# Patient Record
Sex: MALE | Race: WHITE | NOT HISPANIC OR LATINO | URBAN - METROPOLITAN AREA
[De-identification: names, ages, dates, MRNs, and addresses within clinical notes are randomized per-mention and may not be internally consistent; named-entity substitution may affect disease eponyms.]

---

## 2017-01-12 ENCOUNTER — OUTPATIENT (OUTPATIENT)
Dept: OUTPATIENT SERVICES | Facility: HOSPITAL | Age: 67
LOS: 1 days | End: 2017-01-12
Payer: MEDICARE

## 2017-01-12 DIAGNOSIS — Z01.818 ENCOUNTER FOR OTHER PREPROCEDURAL EXAMINATION: ICD-10-CM

## 2017-01-12 DIAGNOSIS — M19.042 PRIMARY OSTEOARTHRITIS, LEFT HAND: ICD-10-CM

## 2017-01-12 DIAGNOSIS — I10 ESSENTIAL (PRIMARY) HYPERTENSION: ICD-10-CM

## 2017-01-12 DIAGNOSIS — Z01.812 ENCOUNTER FOR PREPROCEDURAL LABORATORY EXAMINATION: ICD-10-CM

## 2017-01-12 DIAGNOSIS — M67.449 GANGLION, UNSPECIFIED HAND: ICD-10-CM

## 2017-01-12 DIAGNOSIS — Z01.810 ENCOUNTER FOR PREPROCEDURAL CARDIOVASCULAR EXAMINATION: ICD-10-CM

## 2017-01-12 LAB
ANION GAP SERPL CALC-SCNC: 14 MMOL/L — SIGNIFICANT CHANGE UP (ref 5–17)
BUN SERPL-MCNC: 19 MG/DL — SIGNIFICANT CHANGE UP (ref 7–23)
CALCIUM SERPL-MCNC: 9.6 MG/DL — SIGNIFICANT CHANGE UP (ref 8.4–10.5)
CHLORIDE SERPL-SCNC: 104 MMOL/L — SIGNIFICANT CHANGE UP (ref 96–108)
CO2 SERPL-SCNC: 23 MMOL/L — SIGNIFICANT CHANGE UP (ref 22–31)
CREAT SERPL-MCNC: 1 MG/DL — SIGNIFICANT CHANGE UP (ref 0.5–1.3)
GLUCOSE SERPL-MCNC: 93 MG/DL — SIGNIFICANT CHANGE UP (ref 70–99)
HCT VFR BLD CALC: 45.9 % — SIGNIFICANT CHANGE UP (ref 39–50)
HGB BLD-MCNC: 15.5 G/DL — SIGNIFICANT CHANGE UP (ref 13–17)
MCHC RBC-ENTMCNC: 30.2 PG — SIGNIFICANT CHANGE UP (ref 27–34)
MCHC RBC-ENTMCNC: 33.8 GM/DL — SIGNIFICANT CHANGE UP (ref 32–36)
MCV RBC AUTO: 89.3 FL — SIGNIFICANT CHANGE UP (ref 80–100)
PLATELET # BLD AUTO: 241 K/UL — SIGNIFICANT CHANGE UP (ref 150–400)
POTASSIUM SERPL-MCNC: 4.2 MMOL/L — SIGNIFICANT CHANGE UP (ref 3.5–5.3)
POTASSIUM SERPL-SCNC: 4.2 MMOL/L — SIGNIFICANT CHANGE UP (ref 3.5–5.3)
RBC # BLD: 5.14 M/UL — SIGNIFICANT CHANGE UP (ref 4.2–5.8)
RBC # FLD: 13.5 % — SIGNIFICANT CHANGE UP (ref 10.3–14.5)
SODIUM SERPL-SCNC: 141 MMOL/L — SIGNIFICANT CHANGE UP (ref 135–145)
WBC # BLD: 6.21 K/UL — SIGNIFICANT CHANGE UP (ref 3.8–10.5)
WBC # FLD AUTO: 6.21 K/UL — SIGNIFICANT CHANGE UP (ref 3.8–10.5)

## 2017-01-12 PROCEDURE — 85027 COMPLETE CBC AUTOMATED: CPT

## 2017-01-12 PROCEDURE — G0463: CPT

## 2017-01-12 PROCEDURE — 93005 ELECTROCARDIOGRAM TRACING: CPT

## 2017-01-12 PROCEDURE — 36415 COLL VENOUS BLD VENIPUNCTURE: CPT

## 2017-01-12 PROCEDURE — 93010 ELECTROCARDIOGRAM REPORT: CPT

## 2017-01-12 PROCEDURE — 80048 BASIC METABOLIC PNL TOTAL CA: CPT

## 2017-01-22 ENCOUNTER — RESULT REVIEW (OUTPATIENT)
Age: 67
End: 2017-01-22

## 2017-01-23 ENCOUNTER — APPOINTMENT (OUTPATIENT)
Dept: ORTHOPEDIC SURGERY | Facility: HOSPITAL | Age: 67
End: 2017-01-23

## 2017-01-23 ENCOUNTER — OUTPATIENT (OUTPATIENT)
Dept: OUTPATIENT SERVICES | Facility: HOSPITAL | Age: 67
LOS: 1 days | End: 2017-01-23
Payer: MEDICARE

## 2017-01-23 DIAGNOSIS — M19.042 PRIMARY OSTEOARTHRITIS, LEFT HAND: ICD-10-CM

## 2017-01-23 DIAGNOSIS — M67.449 GANGLION, UNSPECIFIED HAND: ICD-10-CM

## 2017-01-23 PROCEDURE — 20526 THER INJECTION CARP TUNNEL: CPT | Mod: 50,59

## 2017-01-23 PROCEDURE — 20526 THER INJECTION CARP TUNNEL: CPT | Mod: 50,XS

## 2017-01-23 PROCEDURE — 88305 TISSUE EXAM BY PATHOLOGIST: CPT | Mod: 26

## 2017-01-23 PROCEDURE — 26160 REMOVE TENDON SHEATH LESION: CPT | Mod: F1

## 2017-01-23 PROCEDURE — 88305 TISSUE EXAM BY PATHOLOGIST: CPT

## 2017-01-27 LAB — SURGICAL PATHOLOGY STUDY: SIGNIFICANT CHANGE UP

## 2017-02-02 ENCOUNTER — APPOINTMENT (OUTPATIENT)
Dept: ORTHOPEDIC SURGERY | Facility: CLINIC | Age: 67
End: 2017-02-02

## 2017-04-13 ENCOUNTER — APPOINTMENT (OUTPATIENT)
Dept: ORTHOPEDIC SURGERY | Facility: CLINIC | Age: 67
End: 2017-04-13

## 2017-04-13 DIAGNOSIS — M65.331 TRIGGER FINGER, RIGHT MIDDLE FINGER: ICD-10-CM

## 2017-04-13 DIAGNOSIS — M19.042 PRIMARY OSTEOARTHRITIS, LEFT HAND: ICD-10-CM

## 2017-04-13 DIAGNOSIS — R20.0 ANESTHESIA OF SKIN: ICD-10-CM

## 2017-04-13 DIAGNOSIS — M19.041 PRIMARY OSTEOARTHRITIS, RIGHT HAND: ICD-10-CM

## 2017-04-13 DIAGNOSIS — M67.449 GANGLION, UNSPECIFIED HAND: ICD-10-CM

## 2017-05-30 ENCOUNTER — OUTPATIENT (OUTPATIENT)
Dept: OUTPATIENT SERVICES | Facility: HOSPITAL | Age: 67
LOS: 1 days | End: 2017-05-30
Payer: MEDICARE

## 2017-05-30 VITALS
TEMPERATURE: 97 F | OXYGEN SATURATION: 99 % | WEIGHT: 266.1 LBS | DIASTOLIC BLOOD PRESSURE: 88 MMHG | HEIGHT: 70 IN | SYSTOLIC BLOOD PRESSURE: 136 MMHG

## 2017-05-30 DIAGNOSIS — Z98.890 OTHER SPECIFIED POSTPROCEDURAL STATES: Chronic | ICD-10-CM

## 2017-05-30 DIAGNOSIS — M18.12 UNILATERAL PRIMARY OSTEOARTHRITIS OF FIRST CARPOMETACARPAL JOINT, LEFT HAND: ICD-10-CM

## 2017-05-30 DIAGNOSIS — E78.5 HYPERLIPIDEMIA, UNSPECIFIED: ICD-10-CM

## 2017-05-30 DIAGNOSIS — M19.90 UNSPECIFIED OSTEOARTHRITIS, UNSPECIFIED SITE: ICD-10-CM

## 2017-05-30 DIAGNOSIS — I48.91 UNSPECIFIED ATRIAL FIBRILLATION: ICD-10-CM

## 2017-05-30 DIAGNOSIS — Z01.812 ENCOUNTER FOR PREPROCEDURAL LABORATORY EXAMINATION: ICD-10-CM

## 2017-05-30 DIAGNOSIS — I10 ESSENTIAL (PRIMARY) HYPERTENSION: ICD-10-CM

## 2017-05-30 DIAGNOSIS — Z01.818 ENCOUNTER FOR OTHER PREPROCEDURAL EXAMINATION: ICD-10-CM

## 2017-05-30 DIAGNOSIS — M67.449 GANGLION, UNSPECIFIED HAND: ICD-10-CM

## 2017-05-30 DIAGNOSIS — Z90.49 ACQUIRED ABSENCE OF OTHER SPECIFIED PARTS OF DIGESTIVE TRACT: Chronic | ICD-10-CM

## 2017-05-30 DIAGNOSIS — M19.042 PRIMARY OSTEOARTHRITIS, LEFT HAND: ICD-10-CM

## 2017-05-30 DIAGNOSIS — Z00.00 ENCOUNTER FOR GENERAL ADULT MEDICAL EXAMINATION WITHOUT ABNORMAL FINDINGS: ICD-10-CM

## 2017-05-30 DIAGNOSIS — R20.2 PARESTHESIA OF SKIN: ICD-10-CM

## 2017-05-30 DIAGNOSIS — Z01.810 ENCOUNTER FOR PREPROCEDURAL CARDIOVASCULAR EXAMINATION: ICD-10-CM

## 2017-05-30 DIAGNOSIS — Z98.1 ARTHRODESIS STATUS: Chronic | ICD-10-CM

## 2017-05-30 LAB
ANION GAP SERPL CALC-SCNC: 15 MMOL/L — SIGNIFICANT CHANGE UP (ref 5–17)
BUN SERPL-MCNC: 18 MG/DL — SIGNIFICANT CHANGE UP (ref 7–23)
CALCIUM SERPL-MCNC: 9.4 MG/DL — SIGNIFICANT CHANGE UP (ref 8.4–10.5)
CHLORIDE SERPL-SCNC: 103 MMOL/L — SIGNIFICANT CHANGE UP (ref 96–108)
CO2 SERPL-SCNC: 23 MMOL/L — SIGNIFICANT CHANGE UP (ref 22–31)
CREAT SERPL-MCNC: 0.88 MG/DL — SIGNIFICANT CHANGE UP (ref 0.5–1.3)
GLUCOSE SERPL-MCNC: 88 MG/DL — SIGNIFICANT CHANGE UP (ref 70–99)
HCT VFR BLD CALC: 45.1 % — SIGNIFICANT CHANGE UP (ref 39–50)
HGB BLD-MCNC: 15.2 G/DL — SIGNIFICANT CHANGE UP (ref 13–17)
MCHC RBC-ENTMCNC: 30 PG — SIGNIFICANT CHANGE UP (ref 27–34)
MCHC RBC-ENTMCNC: 33.7 GM/DL — SIGNIFICANT CHANGE UP (ref 32–36)
MCV RBC AUTO: 89.1 FL — SIGNIFICANT CHANGE UP (ref 80–100)
PLATELET # BLD AUTO: 228 K/UL — SIGNIFICANT CHANGE UP (ref 150–400)
POTASSIUM SERPL-MCNC: 4.5 MMOL/L — SIGNIFICANT CHANGE UP (ref 3.5–5.3)
POTASSIUM SERPL-SCNC: 4.5 MMOL/L — SIGNIFICANT CHANGE UP (ref 3.5–5.3)
RBC # BLD: 5.06 M/UL — SIGNIFICANT CHANGE UP (ref 4.2–5.8)
RBC # FLD: 13.7 % — SIGNIFICANT CHANGE UP (ref 10.3–14.5)
SODIUM SERPL-SCNC: 141 MMOL/L — SIGNIFICANT CHANGE UP (ref 135–145)
WBC # BLD: 6.45 K/UL — SIGNIFICANT CHANGE UP (ref 3.8–10.5)
WBC # FLD AUTO: 6.45 K/UL — SIGNIFICANT CHANGE UP (ref 3.8–10.5)

## 2017-05-30 PROCEDURE — 85027 COMPLETE CBC AUTOMATED: CPT

## 2017-05-30 PROCEDURE — G0463: CPT

## 2017-05-30 PROCEDURE — 80048 BASIC METABOLIC PNL TOTAL CA: CPT

## 2017-05-30 RX ORDER — CELECOXIB 200 MG/1
200 CAPSULE ORAL ONCE
Qty: 0 | Refills: 0 | Status: COMPLETED | OUTPATIENT
Start: 2017-06-12 | End: 2017-06-12

## 2017-05-30 RX ORDER — LIDOCAINE HCL 20 MG/ML
0.2 VIAL (ML) INJECTION ONCE
Qty: 0 | Refills: 0 | Status: DISCONTINUED | OUTPATIENT
Start: 2017-06-12 | End: 2017-06-27

## 2017-05-30 RX ORDER — SODIUM CHLORIDE 9 MG/ML
3 INJECTION INTRAMUSCULAR; INTRAVENOUS; SUBCUTANEOUS EVERY 8 HOURS
Qty: 0 | Refills: 0 | Status: DISCONTINUED | OUTPATIENT
Start: 2017-06-12 | End: 2017-06-27

## 2017-05-30 RX ORDER — ACETAMINOPHEN 500 MG
975 TABLET ORAL ONCE
Qty: 0 | Refills: 0 | Status: COMPLETED | OUTPATIENT
Start: 2017-06-12 | End: 2017-06-12

## 2017-05-30 NOTE — H&P PST ADULT - HISTORY OF PRESENT ILLNESS
66 year old male PMH of Afib s/p ablation on aspirin, GOLDIE on  CPAP c/o left hand pain x 5 years increasing in intensity presents to PST for Left Wrist Excision trapezium suspension plasty

## 2017-05-30 NOTE — H&P PST ADULT - PSH
H/O cardiac radiofrequency ablation    H/O hand surgery  trigger finger surgery x2  cyst removed left hand 1/2017  H/O knee surgery    History of appendectomy    S/P lumbar spinal fusion

## 2017-05-30 NOTE — H&P PST ADULT - ATTENDING COMMENTS
The patient has pantrapezial osteoarthritis left thumb/wrist, which has been treated with cortisone injections in the past. Pt was attempting to tolerate the pain and adapt, but explains that he has reached a point where he is not willing to "put up with the pain" and disability "any more". He complains that it is "constantly painful" and interferes with ADL. The patient has failed conservative treatment. Excision of trapezium is indicated because of the STT and CMC arthritis. Excision of trapezium and suspension plasty with APL tendon slip are recommended.    Surgery for pantrapezial osteoarthritis of the left wrist is indicated because of symptoms refractory to conservative treatment, including pain and interference with activities of daily living. While the patient may see improvement, the patient may not have complete relief of symptoms or complete return to activities of daily living. Postoperative casting, splinting and hand therapy will be required. The range of treatment alternatives, and the associated risks complications limitations and expectations were explained and discussed, including but not limited to infection, tendon rupture, late first metacarpal-scaphoid impaction, need for additional surgery, radial sensory nerve injury, instability of basal joint, weakness, and ongoing pain and disability. All questions have been answered. The patient has expressed acceptance and understanding of the above and has consented to surgery.

## 2017-06-12 ENCOUNTER — OUTPATIENT (OUTPATIENT)
Dept: OUTPATIENT SERVICES | Facility: HOSPITAL | Age: 67
LOS: 1 days | End: 2017-06-12
Payer: MEDICARE

## 2017-06-12 ENCOUNTER — TRANSCRIPTION ENCOUNTER (OUTPATIENT)
Age: 67
End: 2017-06-12

## 2017-06-12 ENCOUNTER — APPOINTMENT (OUTPATIENT)
Dept: ORTHOPEDIC SURGERY | Facility: HOSPITAL | Age: 67
End: 2017-06-12

## 2017-06-12 ENCOUNTER — RESULT REVIEW (OUTPATIENT)
Age: 67
End: 2017-06-12

## 2017-06-12 VITALS
HEART RATE: 71 BPM | OXYGEN SATURATION: 97 % | RESPIRATION RATE: 16 BRPM | DIASTOLIC BLOOD PRESSURE: 70 MMHG | SYSTOLIC BLOOD PRESSURE: 119 MMHG

## 2017-06-12 VITALS — WEIGHT: 264.55 LBS | HEIGHT: 70 IN

## 2017-06-12 DIAGNOSIS — Z98.890 OTHER SPECIFIED POSTPROCEDURAL STATES: Chronic | ICD-10-CM

## 2017-06-12 DIAGNOSIS — Z00.00 ENCOUNTER FOR GENERAL ADULT MEDICAL EXAMINATION WITHOUT ABNORMAL FINDINGS: ICD-10-CM

## 2017-06-12 DIAGNOSIS — Z98.1 ARTHRODESIS STATUS: Chronic | ICD-10-CM

## 2017-06-12 DIAGNOSIS — Z90.49 ACQUIRED ABSENCE OF OTHER SPECIFIED PARTS OF DIGESTIVE TRACT: Chronic | ICD-10-CM

## 2017-06-12 DIAGNOSIS — Z01.818 ENCOUNTER FOR OTHER PREPROCEDURAL EXAMINATION: ICD-10-CM

## 2017-06-12 PROCEDURE — 25447 ARTHRP NTRCRP/CRP/MTCR NTRPS: CPT | Mod: LT

## 2017-06-12 PROCEDURE — 88305 TISSUE EXAM BY PATHOLOGIST: CPT

## 2017-06-12 PROCEDURE — 88305 TISSUE EXAM BY PATHOLOGIST: CPT | Mod: 26

## 2017-06-12 RX ORDER — OXYCODONE HYDROCHLORIDE 5 MG/1
1 TABLET ORAL
Qty: 0 | Refills: 0 | COMMUNITY
Start: 2017-06-12

## 2017-06-12 RX ORDER — SODIUM CHLORIDE 9 MG/ML
1000 INJECTION INTRAMUSCULAR; INTRAVENOUS; SUBCUTANEOUS
Qty: 0 | Refills: 0 | Status: DISCONTINUED | OUTPATIENT
Start: 2017-06-12 | End: 2017-06-27

## 2017-06-12 RX ORDER — OXYCODONE 10 MG/1
10 TABLET ORAL EVERY 6 HOURS
Qty: 20 | Refills: 0 | Status: DISCONTINUED | COMMUNITY
Start: 2017-06-12 | End: 2017-06-12

## 2017-06-12 RX ORDER — HYDROMORPHONE HYDROCHLORIDE 2 MG/ML
0.25 INJECTION INTRAMUSCULAR; INTRAVENOUS; SUBCUTANEOUS
Qty: 0 | Refills: 0 | Status: DISCONTINUED | OUTPATIENT
Start: 2017-06-12 | End: 2017-06-12

## 2017-06-12 RX ORDER — CELECOXIB 200 MG/1
200 CAPSULE ORAL ONCE
Qty: 0 | Refills: 0 | Status: DISCONTINUED | OUTPATIENT
Start: 2017-06-12 | End: 2017-06-27

## 2017-06-12 RX ORDER — OXYCODONE HYDROCHLORIDE 5 MG/1
10 TABLET ORAL ONCE
Qty: 0 | Refills: 0 | Status: DISCONTINUED | OUTPATIENT
Start: 2017-06-12 | End: 2017-06-12

## 2017-06-12 RX ORDER — OXYCODONE 10 MG/1
10 TABLET ORAL EVERY 6 HOURS
Qty: 20 | Refills: 0 | Status: ACTIVE | COMMUNITY
Start: 2017-06-12 | End: 1900-01-01

## 2017-06-12 RX ORDER — ONDANSETRON 8 MG/1
4 TABLET, FILM COATED ORAL ONCE
Qty: 0 | Refills: 0 | Status: DISCONTINUED | OUTPATIENT
Start: 2017-06-12 | End: 2017-06-27

## 2017-06-12 RX ORDER — OXYCODONE HYDROCHLORIDE 5 MG/1
5 TABLET ORAL ONCE
Qty: 0 | Refills: 0 | Status: DISCONTINUED | OUTPATIENT
Start: 2017-06-12 | End: 2017-06-12

## 2017-06-12 RX ADMIN — Medication 975 MILLIGRAM(S): at 10:11

## 2017-06-12 RX ADMIN — CELECOXIB 200 MILLIGRAM(S): 200 CAPSULE ORAL at 10:11

## 2017-06-12 NOTE — BRIEF OPERATIVE NOTE - PROCEDURE
Trapeziectomy with ligament reconstruction arthroplasty of left trapeziometacarpal joint  06/12/2017    Active  [id number]

## 2017-06-12 NOTE — ASU DISCHARGE PLAN (ADULT/PEDIATRIC). - MEDICATION SUMMARY - MEDICATIONS TO TAKE
I will START or STAY ON the medications listed below when I get home from the hospital:    oxyCODONE 5 mg oral tablet  -- 1 tab(s) by mouth once, As needed, Moderate Pain (4 - 6)  -- Indication: For Pain    oxyCODONE 10 mg oral tablet  -- 1 tab(s) by mouth once, As needed, Severe Pain (7 - 10)  -- Indication: For Pain    aspirin 325 mg oral tablet  -- 1 tab(s) by mouth once a day continue taking  -- Indication: For DVT prophylaxis    sotalol 80 mg oral tablet  -- 1 tab(s) by mouth 2 times a day  -- Indication: For Hypertension    fenofibrate 160 mg oral tablet  -- 1 tab(s) by mouth once a day  -- Indication: For Hyperlipidemia    metoprolol succinate 50 mg oral tablet, extended release  -- 1 tab(s) by mouth once a day  -- Indication: For Hypertension    famotidine 20 mg oral tablet  --  by mouth as per ASU protocol   -- Indication: For GERD    Vitamin D3 2000 intl units oral capsule  -- 1 cap(s) by mouth 2x a day  -- Indication: For Multivitamin    folic acid 0.8 mg oral tablet  -- 1 tab(s) by mouth 2x a day  -- Indication: For Supplements

## 2017-06-12 NOTE — PRE-ANESTHESIA EVALUATION ADULT - NSANTHPMHFT_GEN_ALL_CORE
AFib s/p cardiac ablation in 2005, in SR now  lumbar fusion, no neuro symptoms at this time  Denies GERD  SOB after 2 flights of stairs  GOLDIE on CPAP

## 2017-06-12 NOTE — PRE-ANESTHESIA EVALUATION ADULT - MALLAMPATI CLASS
stiffness with prolonged neck extension, denies neuro symptoms/Class II - visualization of the soft palate, fauces, and uvula

## 2017-06-15 LAB — SURGICAL PATHOLOGY STUDY: SIGNIFICANT CHANGE UP

## 2017-06-29 ENCOUNTER — APPOINTMENT (OUTPATIENT)
Dept: ORTHOPEDIC SURGERY | Facility: CLINIC | Age: 67
End: 2017-06-29

## 2017-07-27 ENCOUNTER — APPOINTMENT (OUTPATIENT)
Dept: ORTHOPEDIC SURGERY | Facility: CLINIC | Age: 67
End: 2017-07-27
Payer: MEDICARE

## 2017-07-27 DIAGNOSIS — M19.042 PRIMARY OSTEOARTHRITIS, LEFT HAND: ICD-10-CM

## 2017-07-27 PROCEDURE — 73110 X-RAY EXAM OF WRIST: CPT | Mod: LT

## 2017-07-27 PROCEDURE — 99024 POSTOP FOLLOW-UP VISIT: CPT

## 2017-09-14 ENCOUNTER — APPOINTMENT (OUTPATIENT)
Age: 67
End: 2017-09-14
Payer: MEDICARE

## 2017-09-14 VITALS — BODY MASS INDEX: 37.22 KG/M2 | WEIGHT: 260 LBS | HEIGHT: 70 IN

## 2017-09-14 PROCEDURE — 73110 X-RAY EXAM OF WRIST: CPT | Mod: LT

## 2017-09-14 PROCEDURE — 99213 OFFICE O/P EST LOW 20 MIN: CPT

## 2018-07-05 ENCOUNTER — APPOINTMENT (OUTPATIENT)
Dept: ORTHOPEDIC SURGERY | Facility: CLINIC | Age: 68
End: 2018-07-05
Payer: MEDICARE

## 2018-07-05 DIAGNOSIS — M67.441 GANGLION, RIGHT HAND: ICD-10-CM

## 2018-07-05 PROCEDURE — 20612 ASPIRATE/INJ GANGLION CYST: CPT | Mod: F8

## 2018-07-05 PROCEDURE — 99214 OFFICE O/P EST MOD 30 MIN: CPT | Mod: 25

## 2018-07-05 RX ORDER — METOPROLOL SUCCINATE 50 MG/1
50 TABLET, EXTENDED RELEASE ORAL
Qty: 90 | Refills: 0 | Status: ACTIVE | COMMUNITY
Start: 2017-05-16

## 2018-07-05 RX ORDER — FENOFIBRATE 160 MG/1
160 TABLET ORAL
Qty: 90 | Refills: 0 | Status: ACTIVE | COMMUNITY
Start: 2017-06-12

## 2018-10-29 PROBLEM — M19.90 UNSPECIFIED OSTEOARTHRITIS, UNSPECIFIED SITE: Chronic | Status: ACTIVE | Noted: 2017-05-30

## 2018-10-29 PROBLEM — E78.5 HYPERLIPIDEMIA, UNSPECIFIED: Chronic | Status: ACTIVE | Noted: 2017-05-30

## 2018-10-29 PROBLEM — I48.91 UNSPECIFIED ATRIAL FIBRILLATION: Chronic | Status: ACTIVE | Noted: 2017-05-30

## 2018-11-08 ENCOUNTER — APPOINTMENT (OUTPATIENT)
Dept: ORTHOPEDIC SURGERY | Facility: CLINIC | Age: 68
End: 2018-11-08
Payer: MEDICARE

## 2018-11-08 VITALS
BODY MASS INDEX: 37.22 KG/M2 | SYSTOLIC BLOOD PRESSURE: 132 MMHG | WEIGHT: 260 LBS | HEIGHT: 70 IN | DIASTOLIC BLOOD PRESSURE: 78 MMHG | HEART RATE: 66 BPM

## 2018-11-08 PROCEDURE — 73030 X-RAY EXAM OF SHOULDER: CPT | Mod: LT

## 2018-11-08 PROCEDURE — 99213 OFFICE O/P EST LOW 20 MIN: CPT | Mod: 25

## 2018-11-08 PROCEDURE — 20610 DRAIN/INJ JOINT/BURSA W/O US: CPT | Mod: LT

## 2019-01-10 ENCOUNTER — APPOINTMENT (OUTPATIENT)
Dept: ORTHOPEDIC SURGERY | Facility: CLINIC | Age: 69
End: 2019-01-10
Payer: MEDICARE

## 2019-01-10 DIAGNOSIS — M25.512 PAIN IN LEFT SHOULDER: ICD-10-CM

## 2019-01-10 PROCEDURE — 99213 OFFICE O/P EST LOW 20 MIN: CPT

## 2019-01-10 NOTE — HISTORY OF PRESENT ILLNESS
[All Other ROS Normal] : All other review of systems are negative except as noted [Joint Pain] : joint pain [Muscle Aches] : muscle aches [Past Hx] : past medical [Fam Hx] : family [Soc Hx] : social [All] : medication and allergy [FreeTextEntry1] : Left shoulder pain  [FreeTextEntry2] : 68 year old male presents today with worsening of chronic left shoulder pain. No injury reported. The pain is intermittent brought on with over the head movements and internal rotation. He received a cortisone injection at the last visit which was not very helpful. He is not taking pain medication. Denies numbness or tingling. \par

## 2019-01-10 NOTE — PHYSICAL EXAM
[Normal] : normal [Poor Appearance] : well-appearing [Acute Distress] : not in acute distress [Obese] : not obese [FreeTextEntry2] : Oriented to time, place, person\par Mood: Normal\par Affect: Normal\par \par Left shoulder exam\par \par Inspection: No malalignment, No defects, No atrophy\par Skin: No masses, No lesions\par Neck: Negative Spurlings, full ROM, no pain with ROM\par AROM: FF to 160, abduction to 90, ER to 60, IR to mid lumbar\par Painful arc ROM: Pain with further passive motion\par Tenderness: Minimal bicipital tenderness, mild tenderness to the greater tuberosity/RTC insertion, no anterior shoulder/lesser tuberosity tenderness\par Strength: 4/5 ER, 5/5 IR in adduction, 44/5 supraspinatus testing, positive O'Briens test\par AC Joint: No ttp/pain with cross arm testing\par Biceps: Speed Negative, Yergusons Negative\par Impingement test: Positive Meier, Positive Neer \par Stability: Stable\par Vasc: 2+ radial pulse\par Neuro: AIN, PIN, Ulnar nerve in tact to motor\par Sensation: In tact to light touch throughout\par \par  [de-identified] : 3 views of the left shoulder were obtained 11.8.18 that show no acute fracture or dislocation. There is minimal glenohumeral and moderate AC joint degenerative change seen. Type II acromion. There is no significant malalignment. No significant other obvious osseous abnormality, otherwise unremarkable.

## 2019-01-10 NOTE — DISCUSSION/SUMMARY
[de-identified] : 68-year-old male with left shoulder pain\par \par Chronic pain to the left shoulder with recent exacerbation. Injection therapy has not provided any significant relief of symptoms. Considering continued impingement symptoms as well as likely underlying rotator cuff dysfunction, recommendation at this time is to obtain MRI imaging to confirm degree of rotator cuff involvement and to stratify him for further treatment.\par \par Discussed continued conservative management (PT, rest etc) versus operative intervention. \par \par Patient voiced understanding. Followup after MRI.

## 2019-01-29 ENCOUNTER — APPOINTMENT (OUTPATIENT)
Dept: ORTHOPEDIC SURGERY | Facility: CLINIC | Age: 69
End: 2019-01-29
Payer: MEDICARE

## 2019-01-29 PROCEDURE — 99214 OFFICE O/P EST MOD 30 MIN: CPT

## 2019-03-29 ENCOUNTER — OUTPATIENT (OUTPATIENT)
Dept: OUTPATIENT SERVICES | Facility: HOSPITAL | Age: 69
LOS: 1 days | End: 2019-03-29
Payer: MEDICARE

## 2019-03-29 VITALS
DIASTOLIC BLOOD PRESSURE: 72 MMHG | OXYGEN SATURATION: 98 % | SYSTOLIC BLOOD PRESSURE: 130 MMHG | WEIGHT: 214.07 LBS | RESPIRATION RATE: 16 BRPM | TEMPERATURE: 97 F | HEIGHT: 70.25 IN | HEART RATE: 65 BPM

## 2019-03-29 DIAGNOSIS — Z98.890 OTHER SPECIFIED POSTPROCEDURAL STATES: Chronic | ICD-10-CM

## 2019-03-29 DIAGNOSIS — Z98.1 ARTHRODESIS STATUS: Chronic | ICD-10-CM

## 2019-03-29 DIAGNOSIS — Z90.49 ACQUIRED ABSENCE OF OTHER SPECIFIED PARTS OF DIGESTIVE TRACT: Chronic | ICD-10-CM

## 2019-03-29 DIAGNOSIS — M75.112 INCOMPLETE ROTATOR CUFF TEAR OR RUPTURE OF LEFT SHOULDER, NOT SPECIFIED AS TRAUMATIC: ICD-10-CM

## 2019-03-29 LAB
ANION GAP SERPL CALC-SCNC: 11 MMO/L — SIGNIFICANT CHANGE UP (ref 7–14)
BUN SERPL-MCNC: 20 MG/DL — SIGNIFICANT CHANGE UP (ref 7–23)
CALCIUM SERPL-MCNC: 9.8 MG/DL — SIGNIFICANT CHANGE UP (ref 8.4–10.5)
CHLORIDE SERPL-SCNC: 104 MMOL/L — SIGNIFICANT CHANGE UP (ref 98–107)
CO2 SERPL-SCNC: 26 MMOL/L — SIGNIFICANT CHANGE UP (ref 22–31)
CREAT SERPL-MCNC: 1 MG/DL — SIGNIFICANT CHANGE UP (ref 0.5–1.3)
GLUCOSE SERPL-MCNC: 77 MG/DL — SIGNIFICANT CHANGE UP (ref 70–99)
HCT VFR BLD CALC: 43.1 % — SIGNIFICANT CHANGE UP (ref 39–50)
HGB BLD-MCNC: 14.3 G/DL — SIGNIFICANT CHANGE UP (ref 13–17)
MCHC RBC-ENTMCNC: 30.6 PG — SIGNIFICANT CHANGE UP (ref 27–34)
MCHC RBC-ENTMCNC: 33.2 % — SIGNIFICANT CHANGE UP (ref 32–36)
MCV RBC AUTO: 92.1 FL — SIGNIFICANT CHANGE UP (ref 80–100)
NRBC # FLD: 0 K/UL — SIGNIFICANT CHANGE UP (ref 0–0)
PLATELET # BLD AUTO: 192 K/UL — SIGNIFICANT CHANGE UP (ref 150–400)
PMV BLD: 11.2 FL — SIGNIFICANT CHANGE UP (ref 7–13)
POTASSIUM SERPL-MCNC: 4.6 MMOL/L — SIGNIFICANT CHANGE UP (ref 3.5–5.3)
POTASSIUM SERPL-SCNC: 4.6 MMOL/L — SIGNIFICANT CHANGE UP (ref 3.5–5.3)
RBC # BLD: 4.68 M/UL — SIGNIFICANT CHANGE UP (ref 4.2–5.8)
RBC # FLD: 13.2 % — SIGNIFICANT CHANGE UP (ref 10.3–14.5)
SODIUM SERPL-SCNC: 141 MMOL/L — SIGNIFICANT CHANGE UP (ref 135–145)
WBC # BLD: 6.31 K/UL — SIGNIFICANT CHANGE UP (ref 3.8–10.5)
WBC # FLD AUTO: 6.31 K/UL — SIGNIFICANT CHANGE UP (ref 3.8–10.5)

## 2019-03-29 PROCEDURE — 93010 ELECTROCARDIOGRAM REPORT: CPT

## 2019-03-29 RX ORDER — SODIUM CHLORIDE 9 MG/ML
1000 INJECTION, SOLUTION INTRAVENOUS
Qty: 0 | Refills: 0 | Status: DISCONTINUED | OUTPATIENT
Start: 2019-04-17 | End: 2019-05-02

## 2019-03-29 RX ORDER — METOPROLOL TARTRATE 50 MG
1 TABLET ORAL
Qty: 0 | Refills: 0 | COMMUNITY

## 2019-03-29 RX ORDER — ASPIRIN/CALCIUM CARB/MAGNESIUM 324 MG
1 TABLET ORAL
Qty: 0 | Refills: 0 | COMMUNITY

## 2019-03-29 RX ORDER — FENOFIBRATE,MICRONIZED 130 MG
1 CAPSULE ORAL
Qty: 0 | Refills: 0 | COMMUNITY

## 2019-03-29 RX ORDER — FAMOTIDINE 10 MG/ML
0 INJECTION INTRAVENOUS
Qty: 0 | Refills: 0 | COMMUNITY

## 2019-03-29 NOTE — H&P PST ADULT - NSICDXPASTSURGICALHX_GEN_ALL_CORE_FT
PAST SURGICAL HISTORY:  H/O cardiac radiofrequency ablation 2007 for a-fib    H/O hand surgery trigger finger surgery x2  cyst removed left hand 1/2017    H/O knee surgery     History of appendectomy     S/P lumbar spinal fusion

## 2019-03-29 NOTE — H&P PST ADULT - NSICDXPASTMEDICALHX_GEN_ALL_CORE_FT
PAST MEDICAL HISTORY:  Afib     HLD (hyperlipidemia)     Hypertension     Incomplete tear of right rotator cuff     OA (osteoarthritis) PAST MEDICAL HISTORY:  Afib     HLD (hyperlipidemia)     Hypertension     Incomplete tear of right rotator cuff     OA (osteoarthritis)     Obstructive sleep apnea

## 2019-03-29 NOTE — H&P PST ADULT - NEGATIVE CARDIOVASCULAR SYMPTOMS
no chest pain/no palpitations/no claudication/no orthopnea/no paroxysmal nocturnal dyspnea/no peripheral edema/no dyspnea on exertion

## 2019-03-29 NOTE — H&P PST ADULT - HISTORY OF PRESENT ILLNESS
67 y/o male presents for preop eval for scheduled left shoulder major debridement, subacromial decompression, arthroscopy rotator cuff repair, open biceps tenodesis on 4/17/19.  Per pt general wear & tear but progressively worsened in the past year.

## 2019-03-29 NOTE — H&P PST ADULT - NEGATIVE GENERAL GENITOURINARY SYMPTOMS
normal urinary frequency/no renal colic/no bladder infections/no flank pain L/no flank pain R/no dysuria

## 2019-03-29 NOTE — H&P PST ADULT - NSICDXPROBLEM_GEN_ALL_CORE_FT
Incomplete rotator cuff tear:  scheduled left shoulder major debridement, subacromial decompression, arthroscopy rotator cuff repair, open biceps tenodesis on 4/17/19  preop instructions, gi prophylaxis & surgical soap given  pt verbalized understanding    Atrial fib:  On adult aspirin, instructed to stop 5 days prior to surgery  Pending copy of most recent cardiology studies    Hypertension:  continue medication per routine schedule  Medical eval requested by surgeon    Obstructive sleep apnea  Compliant with CPAP  Or booking notified via fax

## 2019-04-16 ENCOUNTER — TRANSCRIPTION ENCOUNTER (OUTPATIENT)
Age: 69
End: 2019-04-16

## 2019-04-16 RX ORDER — OXYCODONE AND ACETAMINOPHEN 5; 325 MG/1; MG/1
5-325 TABLET ORAL
Qty: 50 | Refills: 0 | Status: ACTIVE | COMMUNITY
Start: 2019-04-16 | End: 1900-01-01

## 2019-04-16 RX ORDER — FAMOTIDINE 10 MG/ML
0 INJECTION INTRAVENOUS
Qty: 0 | Refills: 0 | COMMUNITY
Start: 2019-04-16 | End: 2019-04-17

## 2019-04-17 ENCOUNTER — APPOINTMENT (OUTPATIENT)
Dept: ORTHOPEDIC SURGERY | Facility: AMBULATORY SURGERY CENTER | Age: 69
End: 2019-04-17

## 2019-04-17 ENCOUNTER — OUTPATIENT (OUTPATIENT)
Dept: OUTPATIENT SERVICES | Facility: HOSPITAL | Age: 69
LOS: 1 days | Discharge: HOME | End: 2019-04-17
Payer: MEDICARE

## 2019-04-17 VITALS
DIASTOLIC BLOOD PRESSURE: 66 MMHG | RESPIRATION RATE: 18 BRPM | WEIGHT: 214.07 LBS | HEART RATE: 63 BPM | HEIGHT: 70.25 IN | OXYGEN SATURATION: 100 % | SYSTOLIC BLOOD PRESSURE: 124 MMHG | TEMPERATURE: 97 F

## 2019-04-17 VITALS
TEMPERATURE: 98 F | SYSTOLIC BLOOD PRESSURE: 133 MMHG | DIASTOLIC BLOOD PRESSURE: 73 MMHG | RESPIRATION RATE: 16 BRPM | OXYGEN SATURATION: 100 % | HEART RATE: 70 BPM

## 2019-04-17 DIAGNOSIS — Z90.49 ACQUIRED ABSENCE OF OTHER SPECIFIED PARTS OF DIGESTIVE TRACT: Chronic | ICD-10-CM

## 2019-04-17 DIAGNOSIS — Z98.1 ARTHRODESIS STATUS: Chronic | ICD-10-CM

## 2019-04-17 DIAGNOSIS — Z98.890 OTHER SPECIFIED POSTPROCEDURAL STATES: Chronic | ICD-10-CM

## 2019-04-17 DIAGNOSIS — M75.112 INCOMPLETE ROTATOR CUFF TEAR OR RUPTURE OF LEFT SHOULDER, NOT SPECIFIED AS TRAUMATIC: ICD-10-CM

## 2019-04-17 PROCEDURE — 23430 REPAIR BICEPS TENDON: CPT | Mod: LT

## 2019-04-17 PROCEDURE — 29823 SHO ARTHRS SRG XTNSV DBRDMT: CPT | Mod: LT

## 2019-04-17 PROCEDURE — 23430 REPAIR BICEPS TENDON: CPT | Mod: AS

## 2019-04-17 PROCEDURE — 29823 SHO ARTHRS SRG XTNSV DBRDMT: CPT | Mod: AS

## 2019-04-17 RX ORDER — LEVOCETIRIZINE DIHYDROCHLORIDE 0.5 MG/ML
1 SOLUTION ORAL
Qty: 0 | Refills: 0 | COMMUNITY

## 2019-04-17 RX ORDER — CHOLECALCIFEROL (VITAMIN D3) 125 MCG
1 CAPSULE ORAL
Qty: 0 | Refills: 0 | COMMUNITY

## 2019-04-17 RX ORDER — ROSUVASTATIN CALCIUM 5 MG/1
1 TABLET ORAL
Qty: 0 | Refills: 0 | COMMUNITY

## 2019-04-17 RX ORDER — ASPIRIN/CALCIUM CARB/MAGNESIUM 324 MG
1 TABLET ORAL
Qty: 0 | Refills: 0 | COMMUNITY

## 2019-04-17 RX ORDER — FOLIC ACID 0.8 MG
1 TABLET ORAL
Qty: 0 | Refills: 0 | COMMUNITY

## 2019-04-17 RX ORDER — FENOFIBRATE,MICRONIZED 130 MG
1 CAPSULE ORAL
Qty: 0 | Refills: 0 | COMMUNITY

## 2019-04-17 RX ORDER — SOTALOL HCL 120 MG
1 TABLET ORAL
Qty: 0 | Refills: 0 | COMMUNITY

## 2019-04-17 RX ORDER — METOPROLOL TARTRATE 50 MG
1 TABLET ORAL
Qty: 0 | Refills: 0 | COMMUNITY

## 2019-04-17 NOTE — ASU DISCHARGE PLAN (ADULT/PEDIATRIC) - NURSING INSTRUCTIONS
no tylenol containing medication if taking percocet as there is tylenol in percocet  increase fluids and fiber if taking prescription pain medication as it may cause constipation

## 2019-04-17 NOTE — ASU DISCHARGE PLAN (ADULT/PEDIATRIC) - CALL YOUR DOCTOR IF YOU HAVE ANY OF THE FOLLOWING:
Swelling that gets worse/Fever greater than (need to indicate Fahrenheit or Celsius)/Numbness, tingling, color or temperature change to extremity Fever greater than (need to indicate Fahrenheit or Celsius)/Numbness, tingling, color or temperature change to extremity/Swelling that gets worse/Pain not relieved by Medications

## 2019-04-17 NOTE — ASU DISCHARGE PLAN (ADULT/PEDIATRIC) - CARE PROVIDER_API CALL
Nelson Schwarz)  Orthopedics  [address] [facility] [address]  [place] [address]  Phone: [phone]  Fax: [phone]  Follow Up Time:

## 2019-04-29 ENCOUNTER — APPOINTMENT (OUTPATIENT)
Dept: ORTHOPEDIC SURGERY | Facility: CLINIC | Age: 69
End: 2019-04-29
Payer: MEDICARE

## 2019-04-29 PROBLEM — G47.33 OBSTRUCTIVE SLEEP APNEA (ADULT) (PEDIATRIC): Chronic | Status: ACTIVE | Noted: 2019-03-29

## 2019-04-29 PROBLEM — M75.111 INCOMPLETE ROTATOR CUFF TEAR OR RUPTURE OF RIGHT SHOULDER, NOT SPECIFIED AS TRAUMATIC: Chronic | Status: ACTIVE | Noted: 2019-03-29

## 2019-04-29 PROBLEM — I10 ESSENTIAL (PRIMARY) HYPERTENSION: Chronic | Status: ACTIVE | Noted: 2019-03-29

## 2019-04-29 PROCEDURE — 99024 POSTOP FOLLOW-UP VISIT: CPT

## 2019-05-10 NOTE — HISTORY OF PRESENT ILLNESS
[All Other ROS Normal] : All other review of systems are negative except as noted [Joint Pain] : joint pain [Muscle Aches] : muscle aches [Past Hx] : past medical [Fam Hx] : family [Soc Hx] : social [All] : medication and allergy [FreeTextEntry1] : Left shoulder pain  [FreeTextEntry2] : 68 year old male presents today with worsening of chronic left shoulder pain. No injury reported. The pain is intermittent brought on with over the head movements and internal rotation. He received a subacromial cortisone injection which was not very helpful in Nov 2018. He is not taking pain medication. Denies numbness or tingling. MRI left shoulder reviewed that shows degenerative medial subluxation biceps tendon within subscapularis as well as subscapularis dysfunction. Losing ROM.

## 2019-05-10 NOTE — DISCUSSION/SUMMARY
[de-identified] : 68-year-old male with left shoulder pain\par \par MRI consistent with rotator cuff dysfunction through the anterior shoulder and the subscapularis. In addition, patient has long head biceps tendinopathy with partial tearing. Concern for continued impingement and discomfort through the anterior shoulder with subsequent loss of motion and some capsular restriction consistent with secondary adhesive capsulitis. Discussion was had with the patient regarding continuation of symptoms and pain. Patient is failing conservative management, and is now a surgical candidate.\par \par Recommend consideration on surgical arthroscopy with rotator cuff repair and biceps tenotomy/tenodesis. All risks, benefits and alternatives to the proposed surgical procedure, as well as the need for formal post-operative rehabilitation were discussed in great detail with the patient. Risks include but are not limited to pain, bleeding, infection, neurovascular injury, stiffness, Lucian deformity, failure, medical complications (including DVT, PE, MI), and risks of anesthesia. cookie arc brace ordered\par \par The patient expressed understanding and all questions were answered. The patient is electing to proceed, and will have the patient scheduled accordingly.

## 2019-05-10 NOTE — PHYSICAL EXAM
[Normal] : normal [Poor Appearance] : well-appearing [Acute Distress] : not in acute distress [Obese] : not obese [FreeTextEntry2] : Oriented to time, place, person\par Mood: Normal\par Affect: Normal\par \par Left shoulder exam\par \par Inspection: No malalignment, No defects, No atrophy\par Skin: No masses, No lesions\par Neck: Negative Spurlings, full ROM, no pain with ROM\par AROM: FF to 150, abduction to 90, ER to 60, IR to mid lumbar\par Painful arc ROM: Pain with further passive motion\par Tenderness: Minimal bicipital tenderness, mild tenderness to the greater tuberosity/RTC insertion, no anterior shoulder/lesser tuberosity tenderness\par Strength: 4/5 ER, 4/5 IR in adduction, 4/5 supraspinatus testing, positive O'Briens test\par AC Joint: No ttp/pain with cross arm testing\par Biceps: Speed +, Yergusons Negative\par Impingement test: Positive Meier, Positive Neer \par Stability: Stable\par Vasc: 2+ radial pulse\par Neuro: AIN, PIN, Ulnar nerve in tact to motor\par Sensation: In tact to light touch throughout\par \par  [de-identified] : 3 views of the left shoulder were obtained 11.8.18 that show no acute fracture or dislocation. There is minimal glenohumeral and moderate AC joint degenerative change seen. Type II acromion. There is no significant malalignment. No significant other obvious osseous abnormality, otherwise unremarkable.\par \par MRI left shoulder dated 1/14/19 reviewed that shows degenerative medial subluxation biceps tendon within subscapularis as well as subscapularis dysfunction/partial tearing.

## 2019-05-20 NOTE — PRE-ANESTHESIA EVALUATION ADULT - NSANTHAPLANRD_GEN_ALL_CORE
[FreeTextEntry1] : doing well  seen   on  sat  diginose with    asthmas just  finished   steroid 60  mg  daily  on  nebulizer   4   times  a day  not  much better  refer  to  ER  for  evaluation regional/general general/regional/monitored anesthesia care (MAC)

## 2019-05-30 ENCOUNTER — APPOINTMENT (OUTPATIENT)
Dept: ORTHOPEDIC SURGERY | Facility: CLINIC | Age: 69
End: 2019-05-30
Payer: MEDICARE

## 2019-05-30 PROCEDURE — 99024 POSTOP FOLLOW-UP VISIT: CPT

## 2019-05-31 NOTE — HISTORY OF PRESENT ILLNESS
[6] : the patient reports pain that is 6/10 in severity [Chills] : no chills [Fever] : no fever [Nausea] : no nausea [Vomiting] : no vomiting [Clean/Dry/Intact] : clean, dry and intact [Healed] : healed [Erythema] : not erythematous [Discharge] : absent of discharge [Swelling] : not swollen [Dehiscence] : not dehisced [Neuro Intact] : an unremarkable neurological exam [Vascular Intact] : ~T peripheral vascular exam normal [Doing Well] : is doing well [Excellent Pain Control] : has excellent pain control [No Sign of Infection] : is showing no signs of infection [Sutures Removed] : sutures were removed [Steri-Strips Removed & Replaced] : steri-strips removed and replaced [de-identified] : 69-year-old male status post left shoulder, subpectoral biceps tenodesis, SAD, RCIA 4.17.19 [de-identified] : 69-year-old male status post left shoulder, subpectoral biceps tenodesis, SAD, RICA. Patient doing well at this time. Has begun to general reconditioning left shoulder. Reports minimal discomfort. Comfortable in a sling. [de-identified] : Left shoulder exam\par \par Inspection: No significant ecchymosis, no residual swelling\par Skin: Incisions C/D/I, no drainage, healed\par ROM: not tested \par Painful arc ROM: not tested\par Tenderness: Tenderness throughout the shoulder girdle\par Strength: Unable to test\par Vasc: 2+ radial pulse\par Neuro: AIN, PIN, Ulnar nerve in tact to motor\par Sensation: In tact to light touch throughout\par \par  [de-identified] : 69-year-old male status post left shoulder, subpectoral biceps tenodesis, SAD, RICA\par \par Status post left shoulder subpectoral biceps tenodesis with manipulation under anesthesia as well as subacromial decompression. Denies any significant discomfort, and eager to begin physical therapy program. \par \par Recommendation: PT per protocol. Biceps precautions x8 weeks. Ice/NSAIDs p.r.n.\par \par Followup 4 weeks\par \par

## 2019-05-31 NOTE — HISTORY OF PRESENT ILLNESS
[4] : the patient reports pain that is 4/10 in severity [Chills] : no chills [Fever] : no fever [Nausea] : no nausea [Vomiting] : no vomiting [Clean/Dry/Intact] : clean, dry and intact [Healed] : healed [Erythema] : not erythematous [Discharge] : absent of discharge [Swelling] : not swollen [Dehiscence] : not dehisced [Neuro Intact] : an unremarkable neurological exam [Vascular Intact] : ~T peripheral vascular exam normal [Doing Well] : is doing well [Excellent Pain Control] : has excellent pain control [No Sign of Infection] : is showing no signs of infection [de-identified] : 69-year-old male status post left shoulder, subpectoral biceps tenodesis, SAD, RICA 4.17.19 [de-identified] : 69-year-old male status post left shoulder, subpectoral biceps tenodesis, SAD, RICA. He is doing well. Not taking pain medication. Attending PT 2x per week. Denies post op complication. Stiff.  [de-identified] : Left shoulder exam\par \par Inspection: No significant ecchymosis, no residual swelling\par Skin: Incisions C/D/I, no drainage, healed\par AROM: , ABD 70, ER 20\par Painful arc ROM: pain with further ROM\par Tenderness: Tenderness throughout the shoulder girdle\par Strength: 4+/5 IR, 4/5 ER/FF\par Vasc: 2+ radial pulse\par Neuro: AIN, PIN, Ulnar nerve in tact to motor\par Sensation: In tact to light touch throughout\par  [de-identified] : 69-year-old male status post left shoulder, subpectoral biceps tenodesis, SAD, RICA\par \par Patient has a stiffness postoperatively following left shoulder arthroscopy. Patient did have some adhesive capsulitis at time of surgery, a manipulation was performed. Discussion was had with the patient regarding increased ability to range shoulder and to progress with physical therapy in terms of range of motion, strength, function. Otherwise doing well.\par \par Recommendation: PT per protocol. Ice/NSAIDs p.r.n.\par \par Followup 6wks

## 2019-07-11 ENCOUNTER — APPOINTMENT (OUTPATIENT)
Dept: ORTHOPEDIC SURGERY | Facility: CLINIC | Age: 69
End: 2019-07-11
Payer: MEDICARE

## 2019-07-11 DIAGNOSIS — M75.112 INCOMPLETE ROTATOR CUFF TEAR OR RUPTURE OF LEFT SHOULDER, NOT SPECIFIED AS TRAUMATIC: ICD-10-CM

## 2019-07-11 PROCEDURE — 99024 POSTOP FOLLOW-UP VISIT: CPT

## 2019-07-11 NOTE — HISTORY OF PRESENT ILLNESS
[4] : the patient reports pain that is 4/10 in severity [Clean/Dry/Intact] : clean, dry and intact [Healed] : healed [Vascular Intact] : ~T peripheral vascular exam normal [Neuro Intact] : an unremarkable neurological exam [Excellent Pain Control] : has excellent pain control [Doing Well] : is doing well [No Sign of Infection] : is showing no signs of infection [Chills] : no chills [Fever] : no fever [Nausea] : no nausea [Vomiting] : no vomiting [Discharge] : absent of discharge [Swelling] : not swollen [Erythema] : not erythematous [Dehiscence] : not dehisced [de-identified] : 69-year-old male status post left shoulder, subpectoral biceps tenodesis, SAD, RICA 4.17.19 [de-identified] : 69-year-old male status post left shoulder, subpectoral biceps tenodesis, SAD, RICA. He ran out of visits for PT last week and has been doing exercises on his own. Not taking pain medication. Denies post op complication. [de-identified] : Left shoulder exam\par \par Inspection: No swelling\par Skin: Incisions C/D/I, no drainage, healed\par AROM: , ABD 90, ER 50, IR mid lumbar\par PROM: Full / symmetric\par Painful arc ROM: Min\par Tenderness: Min\par Strength: 5/5 IR, 5/5 ER/FF\par Vasc: 2+ radial pulse\par Neuro: AIN, PIN, Ulnar nerve in tact to motor\par Sensation: In tact to light touch throughout [de-identified] : 69-year-old male status post left shoulder, subpectoral biceps tenodesis, SAD, RICA\par \par Patient has regained good range of motion following left shoulder arthroscopy with manipulation. No evidence of adhesive capsulitis at this time. Patient is doing well status post subpectoral tenodesis, and recommendations were made for strengthening and conditioning at this time. \par \par Recommendation: HEP per protocol. Ice/NSAIDs p.r.n. Light activity to tolerance\par \par Followup 3mos

## 2019-07-24 ENCOUNTER — TRANSCRIPTION ENCOUNTER (OUTPATIENT)
Age: 69
End: 2019-07-24

## 2019-07-31 ENCOUNTER — APPOINTMENT (OUTPATIENT)
Dept: ORTHOPEDIC SURGERY | Facility: CLINIC | Age: 69
End: 2019-07-31
Payer: MEDICARE

## 2019-07-31 VITALS
HEART RATE: 58 BPM | BODY MASS INDEX: 37.22 KG/M2 | HEIGHT: 70 IN | SYSTOLIC BLOOD PRESSURE: 123 MMHG | WEIGHT: 260 LBS | DIASTOLIC BLOOD PRESSURE: 80 MMHG

## 2019-07-31 DIAGNOSIS — S39.012A STRAIN OF MUSCLE, FASCIA AND TENDON OF LOWER BACK, INITIAL ENCOUNTER: ICD-10-CM

## 2019-07-31 PROCEDURE — 99214 OFFICE O/P EST MOD 30 MIN: CPT

## 2019-07-31 PROCEDURE — 72100 X-RAY EXAM L-S SPINE 2/3 VWS: CPT

## 2019-07-31 NOTE — PHYSICAL EXAM
[de-identified] : Examination of the lumbar spine reveals no midline tenderness palpation, step-offs, or skin lesions. Well-healed lumbar incision Decreased range of motion with respect to flexion, extension, lateral bending, and rotation. No tenderness to palpation of the sciatic notch. No tenderness palpation of the bilateral greater trochanters. No pain with passive internal/external rotation of the hips. No instability of bilateral lower extremities.  Negative VENKAT. Negative straight leg raise bilaterally. No bowstring. Negative femoral stretch. 5 out of 5 iliopsoas, hip abductors, hips adductors, quadriceps, hamstrings, gastrocsoleus, tibialis anterior, extensor hallucis longus, peroneals. Grossly intact sensation to light touch bilateral lower extremities. 1+ patellar and Achilles reflexes. Downgoing Babinski. No clonus. Intact proprioception. Palpable pulses. No skin lesion and no edema on the right and left lower extremities. [de-identified] : AP lateral lumbar x-rays reveals instrumented L4-5 fusion. Mild hip degenerative changes

## 2019-07-31 NOTE — DISCUSSION/SUMMARY
[de-identified] : Symptomatology at this point appears to be a lumbar strain as well some pain related to his hip arthritis. He will try some home exercises. He will let me know of any changes or worsening of the symptoms

## 2019-07-31 NOTE — HISTORY OF PRESENT ILLNESS
[de-identified] : Mr. NANCY QUARLES  is a 69 year old male who presents with left lumbar and groin pain after gardening 3 weeks ago.  At this point he feels 70% better.  He had a lumbar fusion in 2005.  He has been resting and using Tylenol and motrin for symptom control. Denies any LE radicular symptoms.  Normal bowel and bladder control.   Denies any recent fevers, chills, sweats, weight loss, or infection.\par

## 2019-10-17 ENCOUNTER — APPOINTMENT (OUTPATIENT)
Dept: ORTHOPEDIC SURGERY | Facility: CLINIC | Age: 69
End: 2019-10-17
Payer: MEDICARE

## 2019-10-17 DIAGNOSIS — M67.922 UNSPECIFIED DISORDER OF SYNOVIUM AND TENDON, LEFT UPPER ARM: ICD-10-CM

## 2019-10-17 PROCEDURE — 99213 OFFICE O/P EST LOW 20 MIN: CPT

## 2019-10-17 NOTE — ADDENDUM
[FreeTextEntry1] : This note was written by Hemalatha Darnell on 10/17/2019 acting solely as a scribe for Dr. Nleson Schwarz.\par \par All medical record entries made by the Scribe were at my, Dr. Nelson Schwarz, direction and personally dictated by me on 10/17/2019. I have personally reviewed the chart and agree that the record accurately reflects my personal performance of the history, physical exam, assessment and plan.\par

## 2019-10-17 NOTE — PHYSICAL EXAM
[de-identified] : Oriented to time, place, person\par Mood: Normal\par Affect: Normal\par Appearance: Healthy, well appearing, no acute distress.\par Gait: Normal\par Assistive Devices: None\par \par Left shoulder exam\par \par Inspection: No swelling\par Skin: Incisions C/D/I, no drainage, healed\par AROM: , ABD 90, ER 0, IR upper lumbar\par PROM: Full / symmetric\par Painful arc ROM: None\par Tenderness: None\par Strength: 5/5 IR, 5/5 ER/FF\par Vasc: 2+ radial pulse\par Neuro: AIN, PIN, Ulnar nerve in tact to motor\par Sensation: In tact to light touch throughout

## 2019-10-17 NOTE — DISCUSSION/SUMMARY
[de-identified] : 69-year-old male status post left shoulder, subpectoral biceps tenodesis, SAD, RICA\par \par Patient has regained good range of motion following left shoulder arthroscopy with manipulation. No evidence of adhesive capsulitis at this time. Patient is doing well status post subpectoral tenodesis, and recommendations were made for strengthening and conditioning at this time. Patient denies any theresa symptoms at this time, is happy with his current progress.\par \par Recommendation: HEP per protocol. Ice/NSAIDs p.r.n. Activity to tolerance\par \par Followup as needed.

## 2019-10-17 NOTE — HISTORY OF PRESENT ILLNESS
[de-identified] : 69-year-old male status post left shoulder, subpectoral biceps tenodesis, SAD, RICA. He is doing well. He has returned to his daily activities without pain. Denies pain. He is happy with post operative results.

## 2019-10-21 NOTE — H&P PST ADULT - MALLAMPATI CLASS
Class II - visualization of the soft palate, fauces, and uvula
handoff rec'd from [place] for break coverage

## 2020-09-10 ENCOUNTER — APPOINTMENT (OUTPATIENT)
Dept: ORTHOPEDIC SURGERY | Facility: CLINIC | Age: 70
End: 2020-09-10
Payer: MEDICARE

## 2020-09-10 VITALS
HEART RATE: 62 BPM | HEIGHT: 70 IN | SYSTOLIC BLOOD PRESSURE: 126 MMHG | WEIGHT: 214 LBS | DIASTOLIC BLOOD PRESSURE: 77 MMHG | BODY MASS INDEX: 30.64 KG/M2

## 2020-09-10 DIAGNOSIS — M18.12 UNILATERAL PRIMARY OSTEOARTHRITIS OF FIRST CARPOMETACARPAL JOINT, LEFT HAND: ICD-10-CM

## 2020-09-10 DIAGNOSIS — M19.041 PRIMARY OSTEOARTHRITIS, RIGHT HAND: ICD-10-CM

## 2020-09-10 DIAGNOSIS — M18.11 UNILATERAL PRIMARY OSTEOARTHRITIS OF FIRST CARPOMETACARPAL JOINT, RIGHT HAND: ICD-10-CM

## 2020-09-10 PROCEDURE — 99214 OFFICE O/P EST MOD 30 MIN: CPT

## 2020-09-10 PROCEDURE — 73110 X-RAY EXAM OF WRIST: CPT | Mod: RT

## 2020-09-13 RX ORDER — ROSUVASTATIN CALCIUM 20 MG/1
20 TABLET, FILM COATED ORAL
Qty: 90 | Refills: 0 | Status: ACTIVE | COMMUNITY
Start: 2020-07-07

## 2020-09-13 RX ORDER — METOPROLOL SUCCINATE 25 MG/1
25 TABLET, EXTENDED RELEASE ORAL
Qty: 90 | Refills: 0 | Status: ACTIVE | COMMUNITY
Start: 2020-04-06